# Patient Record
Sex: MALE | Race: WHITE
[De-identification: names, ages, dates, MRNs, and addresses within clinical notes are randomized per-mention and may not be internally consistent; named-entity substitution may affect disease eponyms.]

---

## 2021-07-26 ENCOUNTER — HOSPITAL ENCOUNTER (OUTPATIENT)
Dept: HOSPITAL 47 - ORWHC2ENDO | Age: 36
Discharge: HOME | End: 2021-07-26
Attending: INTERNAL MEDICINE
Payer: OTHER GOVERNMENT

## 2021-07-26 VITALS — TEMPERATURE: 97.6 F

## 2021-07-26 VITALS — DIASTOLIC BLOOD PRESSURE: 76 MMHG | HEART RATE: 61 BPM | SYSTOLIC BLOOD PRESSURE: 117 MMHG

## 2021-07-26 VITALS — RESPIRATION RATE: 16 BRPM

## 2021-07-26 DIAGNOSIS — K21.9: Primary | ICD-10-CM

## 2021-07-26 DIAGNOSIS — D72.820: ICD-10-CM

## 2021-07-26 DIAGNOSIS — K29.50: ICD-10-CM

## 2021-07-26 DIAGNOSIS — Z80.0: ICD-10-CM

## 2021-07-26 DIAGNOSIS — Z12.11: ICD-10-CM

## 2021-07-26 PROCEDURE — 88342 IMHCHEM/IMCYTCHM 1ST ANTB: CPT

## 2021-07-26 PROCEDURE — 88305 TISSUE EXAM BY PATHOLOGIST: CPT

## 2021-07-26 PROCEDURE — 43239 EGD BIOPSY SINGLE/MULTIPLE: CPT

## 2021-07-26 PROCEDURE — 45380 COLONOSCOPY AND BIOPSY: CPT

## 2021-07-26 NOTE — P.PCN
Date of Procedure: 07/26/21


Description of Procedure: 





Brief history:


Patient is a pleasant 36-year-old male presenting for outpatient 

esophagogastroduodenoscopy and colonoscopy for evaluation of GERD and screening 

for colon cancer.  Patient reports a long-standing history of GERD.  Currently 

he is on omeprazole therapy.  Symptoms well controlled on omeprazole however he 

will have breakthrough symptoms of pain this is a medication.  He reports colon 

cancer in his grandparent.  He also states that previously he was told he had 

colitis and needed a colonoscopy.





Procedure performed:


Esophagogastroduodenoscopy with biopsy


Colonoscopy with biopsy





Estimated blood loss:


Minimal.





Preoperative diagnosis:


GERD, screening for malignant neoplasm of the colon, he reports family history 

of colon cancer





Anesthesia: 


MAC





Procedure:


After informed consent was obtained from the patient  was brought into the 

endoscopy unit and IV  sedation was administered by anesthesia under continuous 

monitoring.  Initially upper endoscopy was done.  The Olympus  video 

endoscope was inserted into the mouth and esophagus intubated without any 

difficulty and was gradually advanced into the stomach and duodenum and 

carefully examined.  The bulb and second part of the duodenum appeared normal, 

with biopsies taken to rule out celiac sprue.  The scope was then withdrawn into

the stomach adequately insufflated with air and upon careful examination  the 

antrum and body, cardia and fundus appeared normal, except for some mild 

punctate erythema suggestive of mild gastritis with biopsies.  The scope was 

then withdrawn into the esophagus.  The GE junction was located at 38 cm to the 

incisors.  It appeared regular with no erythema erosions or ulcerations, with 

lower esophageal biopsies taken.  Rest of the esophagus appeared normal.  

Patient tolerated the procedure well.





At this time the patient continued to remain sedation.  Initial digital rectal 

examination was normal.  Olympus  video colonoscope was then inserted into

the rectum and gradually advanced to the cecum without any difficulty.  Careful 

examination was performed as the scope was gradually being withdrawn.  The prep 

was excellent.  The cecum, ascending colon, transverse colon, descending colon, 

sigmoid colon and rectum appeared normal, with random biopsies taken of the 

right and left colon due to patient's reported history of colitis.  The terminal

ileum was intubated and appeared normal with biopsies taken.  Retroflexion was 

performed in the rectum and no lesions were noted.  Patient tolerated the 

procedure well.





Impression:


1.  Mild gastritis.  Biopsies of the duodenum, antrum body and lower esophagus.


2.  Normal-appearing colon from rectum to cecum and normal-appearing terminal 

ileum with random biopsies taken of the right colon, left colon and terminal 

ileum.








Recommendations:


Findings of this examination were discussed with the patient as well as his 

family.  Okay to resume diet.  Okay to resume medications.  Await pathology from

biopsies.  Can repeat colonoscopy in 5 years for family history of colon cancer.

## 2025-03-04 ENCOUNTER — HOSPITAL ENCOUNTER (OUTPATIENT)
Dept: HOSPITAL 47 - EC | Age: 40
Setting detail: OBSERVATION
LOS: 1 days | Discharge: HOME | End: 2025-03-05
Attending: SURGERY | Admitting: SURGERY
Payer: OTHER GOVERNMENT

## 2025-03-04 DIAGNOSIS — Z87.891: ICD-10-CM

## 2025-03-04 DIAGNOSIS — K35.80: Primary | ICD-10-CM

## 2025-03-04 DIAGNOSIS — G47.30: ICD-10-CM

## 2025-03-04 DIAGNOSIS — K21.9: ICD-10-CM

## 2025-03-04 LAB
ALBUMIN SERPL-MCNC: 4.7 G/DL (ref 3.5–5)
ALP SERPL-CCNC: 78 U/L (ref 38–126)
ALT SERPL-CCNC: 76 U/L (ref 4–49)
AMYLASE SERPL-CCNC: 54 U/L (ref 30–110)
ANION GAP SERPL CALC-SCNC: 12 MMOL/L
AST SERPL-CCNC: 51 U/L (ref 17–59)
BASOPHILS # BLD AUTO: 0 K/UL (ref 0–0.2)
BASOPHILS NFR BLD AUTO: 1 %
BUN SERPL-SCNC: 16 MG/DL (ref 9–20)
CALCIUM SPEC-MCNC: 9 MG/DL (ref 8.4–10.2)
CHLORIDE SERPL-SCNC: 98 MMOL/L (ref 98–107)
CO2 SERPL-SCNC: 28 MMOL/L (ref 22–30)
EOSINOPHIL # BLD AUTO: 0 K/UL (ref 0–0.7)
EOSINOPHIL NFR BLD AUTO: 0 %
ERYTHROCYTE [DISTWIDTH] IN BLOOD BY AUTOMATED COUNT: 5.23 M/UL (ref 4.3–5.9)
ERYTHROCYTE [DISTWIDTH] IN BLOOD: 12.3 % (ref 11.5–15.5)
GLUCOSE SERPL-MCNC: 81 MG/DL (ref 74–99)
HCT VFR BLD AUTO: 46.1 % (ref 39–53)
HGB BLD-MCNC: 16.1 GM/DL (ref 13–17.5)
LIPASE SERPL-CCNC: 220 U/L (ref 23–300)
LYMPHOCYTES # SPEC AUTO: 2 K/UL (ref 1–4.8)
LYMPHOCYTES NFR SPEC AUTO: 45 %
MCH RBC QN AUTO: 30.9 PG (ref 25–35)
MCHC RBC AUTO-ENTMCNC: 35 G/DL (ref 31–37)
MCV RBC AUTO: 88.3 FL (ref 80–100)
MONOCYTES # BLD AUTO: 0.4 K/UL (ref 0–1)
MONOCYTES NFR BLD AUTO: 10 %
NEUTROPHILS # BLD AUTO: 2 K/UL (ref 1.3–7.7)
NEUTROPHILS NFR BLD AUTO: 43 %
PH UR: 6 [PH] (ref 5–8)
PLATELET # BLD AUTO: 176 K/UL (ref 150–450)
POTASSIUM SERPL-SCNC: 4.1 MMOL/L (ref 3.5–5.1)
PROT SERPL-MCNC: 7.7 G/DL (ref 6.3–8.2)
SODIUM SERPL-SCNC: 138 MMOL/L (ref 137–145)
SP GR UR: >1.05 (ref 1–1.03)
UROBILINOGEN UR QL STRIP: <2 MG/DL (ref ?–2)
WBC # BLD AUTO: 4.6 K/UL (ref 3.8–10.6)

## 2025-03-04 PROCEDURE — 81003 URINALYSIS AUTO W/O SCOPE: CPT

## 2025-03-04 PROCEDURE — 94760 N-INVAS EAR/PLS OXIMETRY 1: CPT

## 2025-03-04 PROCEDURE — 36415 COLL VENOUS BLD VENIPUNCTURE: CPT

## 2025-03-04 PROCEDURE — 83690 ASSAY OF LIPASE: CPT

## 2025-03-04 PROCEDURE — 87040 BLOOD CULTURE FOR BACTERIA: CPT

## 2025-03-04 PROCEDURE — 82150 ASSAY OF AMYLASE: CPT

## 2025-03-04 PROCEDURE — 85025 COMPLETE CBC W/AUTO DIFF WBC: CPT

## 2025-03-04 PROCEDURE — 74177 CT ABD & PELVIS W/CONTRAST: CPT

## 2025-03-04 PROCEDURE — 88304 TISSUE EXAM BY PATHOLOGIST: CPT

## 2025-03-04 PROCEDURE — 80053 COMPREHEN METABOLIC PANEL: CPT

## 2025-03-04 PROCEDURE — 96374 THER/PROPH/DIAG INJ IV PUSH: CPT

## 2025-03-04 PROCEDURE — 99285 EMERGENCY DEPT VISIT HI MDM: CPT

## 2025-03-04 PROCEDURE — 83605 ASSAY OF LACTIC ACID: CPT

## 2025-03-04 PROCEDURE — 44970 LAPAROSCOPY APPENDECTOMY: CPT

## 2025-03-04 RX ADMIN — CEFAZOLIN ONE MLS: 330 INJECTION, POWDER, FOR SOLUTION INTRAMUSCULAR; INTRAVENOUS at 23:03

## 2025-03-04 RX ADMIN — MORPHINE SULFATE STA MG: 4 INJECTION, SOLUTION INTRAMUSCULAR; INTRAVENOUS at 21:51

## 2025-03-04 RX ADMIN — CEFAZOLIN STA MLS/HR: 330 INJECTION, POWDER, FOR SOLUTION INTRAMUSCULAR; INTRAVENOUS at 21:52

## 2025-03-04 RX ADMIN — LIDOCAINE HYDROCHLORIDE AND EPINEPHRINE ONE ML: 10; 10 INJECTION, SOLUTION INFILTRATION; PERINEURAL at 22:49

## 2025-03-04 RX ADMIN — POTASSIUM CHLORIDE ONE MLS: 14.9 INJECTION, SOLUTION INTRAVENOUS at 23:35

## 2025-03-04 RX ADMIN — PIPERACILLIN AND TAZOBACTAM SCH MLS/HR: 3; .375 INJECTION, POWDER, FOR SOLUTION INTRAVENOUS at 22:46

## 2025-03-04 RX ADMIN — LIDOCAINE HYDROCHLORIDE AND EPINEPHRINE ONE ML: 10; 10 INJECTION, SOLUTION INFILTRATION; PERINEURAL at 23:24

## 2025-03-04 NOTE — P.OP
Date of Procedure: 03/04/25


Preoperative Diagnosis: 


Acute Appendicitis


Postoperative Diagnosis: 


Acute Appendicitis


Procedure(s) Performed: 


Laparoscopic Appendectomy


Anesthesia: ALANNA


Surgeon: Nile Chaparro


Estimated Blood Loss (ml): 10


Pathology: other (Appendix)


Condition: stable


Disposition: PACU


Description of Procedure: 


The patient was seen by me in the preoperative holding area. The risks of the 

procedure were explained. He was taken to the operating room and given 

perioperative antibiotics prior to coming to the surgery. General anesthesia was

carried out without difficulty and a Correia catheter was inserted. The left arm 

was tucked and the abdomen was prepped and draped in sterile fashion. A 5-mm 

blunt port was inserted infra-umbilically at the level of the umbilicus under 

direct vision of a 5-mm 0-degree laparoscope. Once we were inside the abdominal 

cavity, CO2 was instilled to attain an adequate pneumoperitoneum. A left lower 

quadrant 5-mm port was placed under direct vision and a 12-mm port at Mccollum's 

point. The 5-mm scope was introduced at the umbilical port and the appendix was 

easily visualized. The base of the cecum was acutely inflamed but not 

perforated. I then was easily able to grasp the mesoappendix and create a window

between the base of the mesoappendix and the base of the appendix. The 

mesoappendix was taken down with a ligasure device.  The window was big enough 

to get an Endo YULIA purple 60 mm cartridge through it and fired across the base 

of the appendix without difficulty.  I then placed the appendix in an Endobag 

and brought out through the 12 mm port without difficulty. I reinserted the 12 

mm port and irrigated out the right lower quadrant until dry. One final 

inspection revealed no bleeding from the staple line. We then removed all ports 

under direct vision, and there was no bleeding from the abdominal trocar sites. 

The pneumoperitoneum was then deflated. The skin incisions were closed with 4-0 

Monocryl suture. Skin glue was applied. There were no apparent complications.  

There was minimal blood loss. Patient was brought to the recovery room in stable

condition.

## 2025-03-04 NOTE — CT
EXAMINATION TYPE: CT abdomen pelvis w con

 

DATE OF EXAM: 3/4/2025 9:02 PM

 

COMPARISON: CT abdomen pelvis most recent from  2/18/2025

 

CLINICAL INDICATION: Male, 39 years old with history of rlq pain. eval for appendicitis; RLQ abdomina
l pain x 1 week. hx of appenciditis 1 week ago, tried antibiotics to solve it

 

TECHNIQUE:  Axial CT abdomen pelvis w con;Sagittal and coronal reformats were created on a separate w
orkstation. 

Contrast used:100ml mL of Isovue 300 with IV Contrast, (none if empty)

Oral contrast used: without Oral Contrast (none if empty)

CT DLP: 1317.1 mGycm, Automated exposure control for dose reduction was used.

 

FINDINGS: 

LOWER CHEST: Unremarkable

 

ABDOMEN

LIVER: Diffusely hypoattenuating parenchyma.

GALLBLADDER AND BILE DUCTS: Unremarkable.

PANCREAS: Unremarkable.

SPLEEN: Unremarkable.

ADRENAL GLANDS: Unremarkable.

KIDNEYS AND URETERS: Multiple bilateral nonobstructing calculi measuring up to 3 mm.

 

PELVIS

BLADDER: No evidence for wall thickening or mass given limitations of exam.

REPRODUCTIVE: Unremarkable.

 

ABDOMEN & PELVIS

STOMACH AND BOWEL: Small hiatal hernia, duodenum is unremarkable No evidence of bowel obstruction. De
crease in inflammation around the apex on today's exam compared to 2/18/2025

PERITONEUM/RETROPERITONEUM: No evidence of pneumoperitoneum or free fluid.

VASCULATURE: No evidence of aortic aneurysm. 

MUSCULOSKELETAL: No acute osseous abnormalities

LYMPH NODES: No gross evidence for lymphadenopathy.

SOFT TISSUE/ABDOMINAL WALL: Unremarkable

 

IMPRESSION:

1.  Decrease in inflammation around the appendix.

2.  No evidence for obstructive uropathy. Bilateral nonobstructing renal calculi.

3.  .

4.  Hepatic steatosis.

 

[facility], Workstation: [id number], 3/4/2025 9:31 PM

## 2025-03-04 NOTE — P.GSHP
History of Present Illness


H&P Date: 03/04/25


This is a 39 year old male who presents to [facility] ER with RLQ pain.  He has 

associated nausea.  He was recently diagnosed with appendicitis.  At that time, 

he was treated conservatively with antibiotics and appeared to get better.  

However, today he presents with recurrent RLQ pain and the same symptoms as his 

previous appendicitis. He had a CT-AP which shows some inflammatory changes near

his appendix.








ROS Statement: 


Those systems with pertinent positive or pertinent negative responses have been 

documented in the HPI.





ROS Other: All systems not noted in ROS Statement are negative.





Past Medical History


Past Medical History: GERD/Reflux, Sleep Apnea/CPAP/BIPAP


Additional Past Medical History / Comment(s): UPPER GI BLEED POSSIBLE. USES 

CPAP, appendicitis


History of Any Multi-Drug Resistant Organisms: None Reported


Past Surgical History: No Surgical Hx Reported


Additional Past Surgical History / Comment(s): ONLY DENTAL PROCEDURES. Right 

Bicep reattached.


Past Anesthesia/Blood Transfusion Reactions: No Reported Reaction


Additional Past Anesthesia/Blood Transfusion Reaction / Comment(s): HAS NEVER 

HAD ANESTHESIA.


Past Psychological History: Anxiety, Depression, PTSD


Smoking Status: Former smoker


Past Alcohol Use History: Rare


Past Drug Use History: Marijuana





General Exam


General-NAD


[facility]-RRR


Lungs-NLB


Abdomen-soft, TTP RLQ, guarding


Ext-no edema





39 year old male with appendicitis


-OR for Laparoscopic Appendectomy


-NPO


-Zosyn


-IV fluids





Nile Chaparro [facility]


[phone]








Past Medical History


Past Medical History: GERD/Reflux, Sleep Apnea/CPAP/BIPAP


Additional Past Medical History / Comment(s): UPPER GI BLEED POSSIBLE. USES 

CPAP, appendicitis


History of Any Multi-Drug Resistant Organisms: None Reported


Past Surgical History: No Surgical Hx Reported


Additional Past Surgical History / Comment(s): ONLY DENTAL PROCEDURES. Right Bic

ep reattached.


Past Anesthesia/Blood Transfusion Reactions: No Reported Reaction


Additional Past Anesthesia/Blood Transfusion Reaction / Comment(s): HAS NEVER 

HAD ANESTHESIA.


Past Psychological History: Anxiety, Depression, PTSD


Smoking Status: Former smoker


Past Alcohol Use History: Rare


Past Drug Use History: Marijuana





Medications and Allergies


                                Home Medications











 Medication  Instructions  Recorded  Confirmed  Type


 


Ibuprofen [Advil] 200 - 400 mg PO Q6H PRN 07/22/21 02/18/25 History


 


Omeprazole 20 mg PO DAILY 07/22/21 02/18/25 History


 


Amoxic-Pot Clav 875-125Mg 1 tab PO Q12HR 7 Days #14 tab 02/19/25  Rx





[Augmentin 875-125]    








                                    Allergies











Allergy/AdvReac Type Severity Reaction Status Date / Time


 


No Known Allergies Allergy   Verified 03/04/25 19:59














Surgical - Exam


                                   Vital Signs











Temp Pulse Resp BP Pulse Ox


 


 98.8 F   82   18   138/80   99 


 


 03/04/25 19:59  03/04/25 19:59  03/04/25 19:59  03/04/25 19:59  03/04/25 19:59














Results





- Labs





                                 03/04/25 20:14





                                 03/04/25 20:14


                  Abnormal Lab Results - Last 24 Hours (Table)











  03/04/25 Range/Units





  20:14 


 


ALT  76 H  (4-49)  U/L








                                 Diabetes panel











  03/04/25 Range/Units





  20:14 


 


Sodium  138  (137-145)  mmol/L


 


Potassium  4.1  (3.5-5.1)  mmol/L


 


Chloride  98  ()  mmol/L


 


Carbon Dioxide  28  (22-30)  mmol/L


 


BUN  16  (9-20)  mg/dL


 


Creatinine  0.98  (0.66-1.25)  mg/dL


 


Glucose  81  (74-99)  mg/dL


 


Calcium  9.0  (8.4-10.2)  mg/dL


 


AST  51  (17-59)  U/L


 


ALT  76 H  (4-49)  U/L


 


Alkaline Phosphatase  78  ()  U/L


 


Total Protein  7.7  (6.3-8.2)  g/dL


 


Albumin  4.7  (3.5-5.0)  g/dL








                                  Calcium panel











  03/04/25 Range/Units





  20:14 


 


Calcium  9.0  (8.4-10.2)  mg/dL


 


Albumin  4.7  (3.5-5.0)  g/dL








                                 Pituitary panel











  03/04/25 Range/Units





  20:14 


 


Sodium  138  (137-145)  mmol/L


 


Potassium  4.1  (3.5-5.1)  mmol/L


 


Chloride  98  ()  mmol/L


 


Carbon Dioxide  28  (22-30)  mmol/L


 


BUN  16  (9-20)  mg/dL


 


Creatinine  0.98  (0.66-1.25)  mg/dL


 


Glucose  81  (74-99)  mg/dL


 


Calcium  9.0  (8.4-10.2)  mg/dL








                                  Adrenal panel











  03/04/25 Range/Units





  20:14 


 


Sodium  138  (137-145)  mmol/L


 


Potassium  4.1  (3.5-5.1)  mmol/L


 


Chloride  98  ()  mmol/L


 


Carbon Dioxide  28  (22-30)  mmol/L


 


BUN  16  (9-20)  mg/dL


 


Creatinine  0.98  (0.66-1.25)  mg/dL


 


Glucose  81  (74-99)  mg/dL


 


Calcium  9.0  (8.4-10.2)  mg/dL


 


Total Bilirubin  0.8  (0.2-1.3)  mg/dL


 


AST  51  (17-59)  U/L


 


ALT  76 H  (4-49)  U/L


 


Alkaline Phosphatase  78  ()  U/L


 


Total Protein  7.7  (6.3-8.2)  g/dL


 


Albumin  4.7  (3.5-5.0)  g/dL

## 2025-03-04 NOTE — ED
General Adult HPI





- General


Chief complaint: Abdominal Pain


Stated complaint: Abd Pain


Time Seen by Provider: 03/04/25 20:00


Source: patient, RN notes reviewed, old records reviewed


Mode of arrival: ambulatory


Limitations: no limitations





- History of Present Illness


Initial comments: 





Patient is a 39-year-old male who presents emergency department complaining of 

abdominal pain.  Was recently seen in February 2025 a few weeks ago for acute 

appendicitis.  No surgery at that time, as it was uncomplicated.  Treated with 

IV antibiotics and then oral antibiotics at home.  Was due to follow-up with his

surgeon, Dr. Young in 1 week however patient states that over the last day or 

so has been having worsening right lower quadrant abdominal pain.  Worse with 

flexion of the right leg as well as some tenderness on the left side with 

palpation.  Endorses less of an appetite.  Denies emesis.  Denies diarrhea or 

constipation.  Presents for further evaluation at this time.  Believes his 

appendicitis is acting up.





- Related Data


                                Home Medications











 Medication  Instructions  Recorded  Confirmed


 


Ibuprofen [Advil] 200 - 400 mg PO Q6H PRN 07/22/21 02/18/25


 


Omeprazole 20 mg PO DAILY 07/22/21 02/18/25








                                  Previous Rx's











 Medication  Instructions  Recorded


 


Amoxic-Pot Clav 875-125Mg 1 tab PO Q12HR 7 Days #14 tab 02/19/25





[Augmentin 875-125]  











                                    Allergies











Allergy/AdvReac Type Severity Reaction Status Date / Time


 


No Known Allergies Allergy   Verified 03/04/25 19:59














Review of Systems


ROS Statement: 


Those systems with pertinent positive or pertinent negative responses have been 

documented in the HPI.


Review of Systems:


CONST: Denies fever


EYES: Denies blurry vision


ENT: Denies nasal congestion


C/V: Denies Chest pain


RESP: Denies shortness of breath


GI: Endorses right lower quadrant abdominal pain


: Denies dysuria


SKIN: Denies rash.


MSK: Denies joint pain.


NEURO: Denies headache





ROS Other: All systems not noted in ROS Statement are negative.





Past Medical History


Past Medical History: GERD/Reflux, Sleep Apnea/CPAP/BIPAP


Additional Past Medical History / Comment(s): UPPER GI BLEED POSSIBLE. USES 

CPAP, appendicitis


History of Any Multi-Drug Resistant Organisms: None Reported


Past Surgical History: No Surgical Hx Reported


Additional Past Surgical History / Comment(s): ONLY DENTAL PROCEDURES. Right B

icep reattached.


Past Anesthesia/Blood Transfusion Reactions: No Reported Reaction


Additional Past Anesthesia/Blood Transfusion Reaction / Comment(s): HAS NEVER 

HAD ANESTHESIA.


Past Psychological History: Anxiety, Depression, PTSD


Smoking Status: Former smoker


Past Alcohol Use History: Rare


Past Drug Use History: Marijuana





General Exam





- General Exam Comments


Initial Comments: 





General: He is in mild to moderate distress.


HEAD: Normal with no signs of head trauma.


EYES: EOMI


ENT: Hearing grossly intact, normal oropharynx.


RESPIRATORY: Clear breath sounds bilaterally.  No wheezes, rales, or rhonchi.


C/V: Regular rate and rhythm. S1 and S2 auscultated, no edema, peripheral pulses

2+ and intact throughout


ABD: Abdomen soft, nondistended.  Tender to palpation in the right lower 

quadrant.  Appears to have positive Rovsing sign as well as obturator sign.  No 

guarding.  No rebound tenderness.


EXT: Normal range of motion, no obvious deformity


SKIN: No rashes or lesions observed on exposed skin.


NEURO: Alert and oriented x 4.





Limitations: no limitations





Course


                                   Vital Signs











  03/04/25





  19:59


 


Temperature 98.8 F


 


Pulse Rate 82


 


Respiratory 18





Rate 


 


Blood Pressure 138/80


 


O2 Sat by Pulse 99





Oximetry 














Medical Decision Making





- Medical Decision Making





Was pt. sent in by a medical professional or institution (, PA, NP, urgent 

care, hospital, or nursing home...) When possible be specific


@  -No


Did you speak to anyone other than the patient for history (EMS, parent, family,

police, friend...)? What history was obtained from this source 


@  -No


Did you review nursing and triage notes (agree or disagree)?  Why? 


@  -I reviewed and agree with nursing and triage notes


Were old charts reviewed (outside hosp., previous admission, EMS record, old 

EKG, old radiological studies, urgent care reports/EKG's, nursing home records)?

Report findings 


@  -Reviewed chart from February 2025 which did show patient had uncomplicated 

appendicitis that was treated medically with antibiotics.


Differential Diagnosis (chest pain, altered mental status, abdominal pain women,

abdominal pain men, vaginal bleeding, weakness, fever, dyspnea, syncope, 

headache, dizziness, GI bleed, back pain, seizure, CVA, palpatations, mental 

health, musculoskeletal)? 


@  -Differential Abdominal Pain Men:


Appendicitis, cholecystitis, diverticulosis, ischemic bowel, pancreatitis, 

hepatitis, UTI, gastroenteritis, AAA, incarcerated hernia, bowel obstruction, 

constipation, inflammatory bowel, hepatitis, peptic ulcer disease, splenic 

infarction, perforated viscus, testicular torsion, this is not meant to be an 

all-inclusive list





EKG interpreted by me (3pts min.).


@  -None done


X-rays interpreted by me (1pt min.).


@  -None done


CT interpreted by me (1pt min.).


@  -CT abdomen pelvis today reveals no evidence of acute appendicitis.


U/S interpreted by me (1pt. min.).


@  -None done


What testing was considered but not performed or refused? (CT, X-rays, U/S, 

labs)? Why?


@  -None


What meds were considered but not given or refused? Why?


@  -None


Did you discuss the management of the patient with other professionals 

(professionals i.e. , PA, NP, lab, RT, psych nurse, , , 

teacher, , )? Give summary


@  -Discussed with Dr. Chaparro, on-call general surgeon who is covering for Dr. Young.  Discussed that patient is clinically presenting as acute appendicitis 

and he requested patient be made n.p.o. and plans for appendectomy this evening.

 Patient will be admitted under his service.


Was smoking cessation discussed for >3mins.?


@  -No


Was critical care preformed (if so, how long)?


@  -No


Were there social determinants of health that impacted care today? How? 

(Homelessness, low income, unemployed, alcoholism, drug addiction, 

transportation, low edu. Level, literacy, decrease access to med. care, FDC, 

rehab)?


@  -No


Was there de-escalation of care discussed even if they declined (Discuss DNR or 

withdrawal of care, Hospice)? DNR status


@  -No


What co-morbidities impacted this encounter? (DM, HTN, Smoking, COPD, CAD, 

Cancer, CVA, ARF, Chemo, Hep., AIDS, mental health diagnosis, sleep apnea, 

morbid obesity)?


@  -None


Was patient admitted / discharged? Hospital course, mention meds given and 

route, prescriptions, significant lab abnormalities, going to OR and other 

pertinent info.


@  -Based on the patient's presentation and physical exam, patient presents to 

the emergency department complaining of reoccurrence of appendicitis pain.  Was 

receiving medical management but over the last day, symptoms have returned.  

Seems to have McBurney's point positive, Rovsing sign positive, as well as 

obturator sign positive.  Patient was evaluated in the waiting room, and 

abdominal labs as well as CT ordered.  Vitals are within acceptable limits.





Laboratory studies unremarkable.  CT does not show acute appendicitis.





On reevaluation, patient clinically is presenting as acute appendicitis.  With 

him recently being diagnosed with it less than a month ago and medically manage,

I did contact the on-call surgeon who is covering for Dr. Young, Dr. Chaparro who 

is in agreement and will take the patient to the operating room for 

appendectomy.  Patient admitted to Dr. Chaparro.  Patient started on Zosyn.  He was

in agreement this plan.


Undiagnosed new problem with uncertain prognosis?


@  -No


Drug Therapy requiring intensive monitoring for toxicity (Heparin, Nitro, 

Insulin, Cardizem)?


@  -No


Were any procedures done?


@  -No


Diagnosis/symptom?


@  -Appendicitis


Acute, or Chronic, or Acute on Chronic?


@  -Acute


Uncomplicated (without systemic symptoms) or Complicated (systemic symptoms)?


@  -Complicated


Side effects of treatment?


@  -No


Exacerbation, Progression, or Severe Exacerbation?


@  -No


Poses a threat to life or bodily function? How? (Chest pain, USA, MI, pneumonia,

PE, COPD, DKA, ARF, appy, cholecystitis, CVA, Diverticulitis, Homicidal, 

Suicidal, threat to staff... and all critical care pts)


@  -Possibly, yes








- Lab Data


Result diagrams: 


                                 03/04/25 20:14





                                 03/04/25 20:14


                                   Lab Results











  03/04/25 03/04/25 03/04/25 Range/Units





  20:14 20:14 21:26 


 


WBC  4.6    (3.8-10.6)  k/uL


 


RBC  5.23    (4.30-5.90)  m/uL


 


Hgb  16.1    (13.0-17.5)  gm/dL


 


Hct  46.1    (39.0-53.0)  %


 


MCV  88.3    (80.0-100.0)  fL


 


MCH  30.9    (25.0-35.0)  pg


 


MCHC  35.0    (31.0-37.0)  g/dL


 


RDW  12.3    (11.5-15.5)  %


 


Plt Count  176    (150-450)  k/uL


 


MPV  9.5    


 


Neutrophils %  43    %


 


Lymphocytes %  45    %


 


Monocytes %  10    %


 


Eosinophils %  0    %


 


Basophils %  1    %


 


Neutrophils #  2.0    (1.3-7.7)  k/uL


 


Lymphocytes #  2.0    (1.0-4.8)  k/uL


 


Monocytes #  0.4    (0-1.0)  k/uL


 


Eosinophils #  0.0    (0-0.7)  k/uL


 


Basophils #  0.0    (0-0.2)  k/uL


 


Sodium   138   (137-145)  mmol/L


 


Potassium   4.1   (3.5-5.1)  mmol/L


 


Chloride   98   ()  mmol/L


 


Carbon Dioxide   28   (22-30)  mmol/L


 


Anion Gap   12   mmol/L


 


BUN   16   (9-20)  mg/dL


 


Creatinine   0.98   (0.66-1.25)  mg/dL


 


Est GFR (CKD-EPI)AfAm   >90   (>60 ml/min/1.73 sqM)  


 


Est GFR (CKD-EPI)NonAf   >90   (>60 ml/min/1.73 sqM)  


 


Glucose   81   (74-99)  mg/dL


 


Plasma Lactic Acid King    0.8  (0.7-2.0)  mmol/L


 


Calcium   9.0   (8.4-10.2)  mg/dL


 


Total Bilirubin   0.8   (0.2-1.3)  mg/dL


 


AST   51   (17-59)  U/L


 


ALT   76 H   (4-49)  U/L


 


Alkaline Phosphatase   78   ()  U/L


 


Total Protein   7.7   (6.3-8.2)  g/dL


 


Albumin   4.7   (3.5-5.0)  g/dL


 


Amylase   54   ()  U/L


 


Lipase   220   ()  U/L














Disposition


Clinical Impression: 


 Appendicitis





Disposition: ADMITTED AS IP TO THIS HOSP


Condition: Stable


Referrals: 


Nikko Palma DO [Primary Care Provider] - 1-2 days


Time of Disposition: 21:57

## 2025-03-05 VITALS
DIASTOLIC BLOOD PRESSURE: 66 MMHG | SYSTOLIC BLOOD PRESSURE: 103 MMHG | RESPIRATION RATE: 17 BRPM | HEART RATE: 92 BPM | TEMPERATURE: 99.2 F

## 2025-03-05 LAB
ALBUMIN SERPL-MCNC: 3.7 G/DL (ref 3.5–5)
ALP SERPL-CCNC: 62 U/L (ref 38–126)
ALT SERPL-CCNC: 62 U/L (ref 4–49)
ANION GAP SERPL CALC-SCNC: 9 MMOL/L
AST SERPL-CCNC: 41 U/L (ref 17–59)
BASOPHILS # BLD AUTO: 0 K/UL (ref 0–0.2)
BASOPHILS NFR BLD AUTO: 0 %
BUN SERPL-SCNC: 14 MG/DL (ref 9–20)
CALCIUM SPEC-MCNC: 8.2 MG/DL (ref 8.4–10.2)
CHLORIDE SERPL-SCNC: 102 MMOL/L (ref 98–107)
CO2 SERPL-SCNC: 24 MMOL/L (ref 22–30)
EOSINOPHIL # BLD AUTO: 0 K/UL (ref 0–0.7)
EOSINOPHIL NFR BLD AUTO: 0 %
ERYTHROCYTE [DISTWIDTH] IN BLOOD BY AUTOMATED COUNT: 4.48 M/UL (ref 4.3–5.9)
ERYTHROCYTE [DISTWIDTH] IN BLOOD: 12.1 % (ref 11.5–15.5)
GLUCOSE SERPL-MCNC: 98 MG/DL (ref 74–99)
HCT VFR BLD AUTO: 40.6 % (ref 39–53)
HGB BLD-MCNC: 13.3 GM/DL (ref 13–17.5)
LYMPHOCYTES # SPEC AUTO: 1.3 K/UL (ref 1–4.8)
LYMPHOCYTES NFR SPEC AUTO: 32 %
MCH RBC QN AUTO: 29.8 PG (ref 25–35)
MCHC RBC AUTO-ENTMCNC: 32.9 G/DL (ref 31–37)
MCV RBC AUTO: 90.7 FL (ref 80–100)
MONOCYTES # BLD AUTO: 0.3 K/UL (ref 0–1)
MONOCYTES NFR BLD AUTO: 7 %
NEUTROPHILS # BLD AUTO: 2.4 K/UL (ref 1.3–7.7)
NEUTROPHILS NFR BLD AUTO: 59 %
PLATELET # BLD AUTO: 158 K/UL (ref 150–450)
POTASSIUM SERPL-SCNC: 3.8 MMOL/L (ref 3.5–5.1)
PROT SERPL-MCNC: 6.2 G/DL (ref 6.3–8.2)
SODIUM SERPL-SCNC: 135 MMOL/L (ref 137–145)
WBC # BLD AUTO: 4 K/UL (ref 3.8–10.6)

## 2025-03-05 RX ADMIN — CEFAZOLIN STA: 330 INJECTION, POWDER, FOR SOLUTION INTRAMUSCULAR; INTRAVENOUS at 06:21

## 2025-03-05 RX ADMIN — PIPERACILLIN AND TAZOBACTAM SCH MLS/HR: 3; .375 INJECTION, POWDER, FOR SOLUTION INTRAVENOUS at 08:59

## 2025-03-05 RX ADMIN — HYDROMORPHONE HYDROCHLORIDE PRN MG: 1 INJECTION, SOLUTION INTRAMUSCULAR; INTRAVENOUS; SUBCUTANEOUS at 08:56

## 2025-03-05 RX ADMIN — HYDROCODONE BITARTRATE AND ACETAMINOPHEN PRN EACH: 10; 325 TABLET ORAL at 14:05

## 2025-03-05 RX ADMIN — HYDROMORPHONE HYDROCHLORIDE STA MG: 1 INJECTION, SOLUTION INTRAMUSCULAR; INTRAVENOUS; SUBCUTANEOUS at 00:08

## 2025-03-05 RX ADMIN — CEFAZOLIN SCH MLS/HR: 330 INJECTION, POWDER, FOR SOLUTION INTRAMUSCULAR; INTRAVENOUS at 00:57

## 2025-03-05 NOTE — P.DS
Providers


Date of admission: 


03/04/25 21:57





Expected date of discharge: 03/05/25


Attending physician: 


Nile Chaparro DO





Consults: 





                                        





03/04/25 22:27


Consult Physician Routine 


   Consulting Provider: Aubrie Stewart


   Consult Reason/Comments: Medical Management


   Do you want consulting provider notified?: Yes











Primary care physician: 


[facility] Course: 





Discharge diagnosis


1.  Acute appendicitis status post laparoscopic appendectomy





Hospital course


This is a 39-year-old male who presented with right lower quadrant abdominal 

pain.He was recently diagnosed with appendicitis.  At that time, he was treated 

conservatively with antibiotics and appeared to get better.  However, today he 

presents with recurrent RLQ pain and the same symptoms as his previous 

appendicitis. He had a CT-AP which shows some inflammatory changes near his 

appendix.  Patient status post laparoscopic appendectomy for acute appendicitis.

 Patient tolerated surgery well.  His pain is controlled.  He is tolerating 

diet.  He has been up and ambulating.  He is afebrile.  Patient is stable for 

discharge.  Please refer to chart for any further details.





Physician Assistant note has been reviewed by physician. Signing provider agrees

with the documented findings, assessment, and plan of care.





Attestation


Patient seen and examined at bedside on 3/5/2025.  Presented with chief 

complaint of abdominal pain and found to have acute appendicitis.  He did 

undergo laparoscopic appendectomy.  Postoperatively his pain is controlled and 

he is tolerating diet.  He is afebrile and is requesting discharge.  Is 

reasonable for discharge with plan for outpatient antibiotics.  Plan to follow-

up in the surgery clinic within a few weeks.  Wound care and activity 

instructions were provided.





Ingrid Eastman DO


Patient Condition at Discharge: Stable





Plan - Discharge Summary


Discharge Rx Participant: No


New Discharge Prescriptions: 


New


   HYDROcodone/APAP 5-325MG [Norco 5-325] 1 tab PO Q6HR PRN 3 Days #12 tab


     PRN Reason: Pain


   Amoxic-Pot Clav 500-125 mg [Augmentin 500-125 mg] 1 tab PO Q12HR #6 tab





Continue


   Ibuprofen [Advil] 200 - 400 mg PO Q6H PRN


     PRN Reason: MIGRAINES


   Omeprazole 20 mg PO DAILY


Discharge Medication List





Ibuprofen [Advil] 200 - 400 mg PO Q6H PRN 07/22/21 [History]


Omeprazole 20 mg PO DAILY 07/22/21 [History]


Amoxic-Pot Clav 500-125 mg [Augmentin 500-125 mg] 1 tab PO Q12HR #6 tab 03/05/25

[Rx]


HYDROcodone/APAP 5-325MG [Norco 5-325] 1 tab PO Q6HR PRN 3 Days #12 tab 03/05/25

[Rx]








Follow up Appointment(s)/Referral(s): 


Nile Chaparro DO [Medical Doctor] - 03/11/25 8:45 am


Nikko Palma DO [Primary Care Provider] - 1-2 days


Patient Instructions/Handouts:  Laparoscopic Appendectomy (GEN)


Activity/Diet/Wound Care/Special Instructions: 


No driving while taking Norco


No lifting over 10 pounds


You may shower. No soaking or tub baths for 2 weeks


Very light activity until you are reevaluated at your follow up appointment with

your surgeon


Discharge Disposition: HOME SELF-CARE

## 2025-03-05 NOTE — P.CONS
History of Present Illness





- Reason for Consult


Consult date: 03/05/25





- Chief Complaint


abdominal pain





- History of Present Illness





Hospital course





This is a 31-year-old male no past medical history presents emergency department

with right-sided lower quadrant abdominal pain.  Underwent appendectomy with no 

complications.  Medicine was consulted for medical management.  Doing well, pain

is well-managed at this time.  Takes no home medications.











Past Medical History


Past Medical History: GERD/Reflux, Sleep Apnea/CPAP/BIPAP


Additional Past Medical History / Comment(s): UPPER GI BLEED POSSIBLE. USES 

CPAP,


History of Any Multi-Drug Resistant Organisms: None Reported


Past Surgical History: No Surgical Hx Reported, Appendectomy


Additional Past Surgical History / Comment(s): ONLY DENTAL PROCEDURES. Right 

Bicep reattached.


Past Anesthesia/Blood Transfusion Reactions: No Reported Reaction


Additional Past Anesthesia/Blood Transfusion Reaction / Comm: HAS NEVER HAD 

ANESTHESIA.


Past Psychological History: Anxiety, Depression, PTSD


Additional Psychological History / Comment(s): WAS IN [facility] FOR ABOUT A 

YEAR.


Smoking Status: Former smoker


Past Alcohol Use History: Rare


Additional Past Alcohol Use History / Comment(s): QUIT 2014, ,5 PPD,


Past Drug Use History: Marijuana


Additional Drug Use History / Comment(s): USES MARIJUANA FOR PSTD





Medications and Allergies


                                Home Medications











 Medication  Instructions  Recorded  Confirmed  Type


 


Ibuprofen [Advil] 200 - 400 mg PO Q6H PRN 07/22/21 03/05/25 History


 


Omeprazole 20 mg PO DAILY 07/22/21 03/05/25 History


 


Amoxic-Pot Clav 500-125 mg 1 tab PO Q12HR #6 tab 03/05/25  Rx





[Augmentin 500-125 mg]    


 


HYDROcodone/APAP 5-325MG [Norco 1 tab PO Q6HR PRN 3 Days #12 tab 03/05/25  Rx





5-325]    








                                    Allergies











Allergy/AdvReac Type Severity Reaction Status Date / Time


 


No Known Allergies Allergy   Verified 03/04/25 19:59














Physical Exam


Vitals: 


                                   Vital Signs











  Temp Pulse Pulse Resp BP BP BP


 


 03/05/25 08:00    92  17   


 


 03/05/25 07:49       


 


 03/05/25 07:00  99.2 F   92  17   103/66 


 


 03/05/25 02:30    56 L     99/64


 


 03/05/25 02:00    65    


 


 03/05/25 01:30    58 L     96/56


 


 03/05/25 01:07    51 L     100/64


 


 03/05/25 00:40  98.8 F   67  18    125/71


 


 03/05/25 00:30    65  14    112/60


 


 03/05/25 00:22    70  14    113/60


 


 03/05/25 00:07    70  14    113/57


 


 03/04/25 23:52    75  14    116/58


 


 03/04/25 22:55    78  16    116/72


 


 03/04/25 19:59  98.8 F  82   18  138/80  














  Pulse Ox


 


 03/05/25 08:00 


 


 03/05/25 07:49  98


 


 03/05/25 07:00  98


 


 03/05/25 02:30  98


 


 03/05/25 02:00 


 


 03/05/25 01:30  97


 


 03/05/25 01:07  97


 


 03/05/25 00:40  93 L


 


 03/05/25 00:30  95


 


 03/05/25 00:22  98


 


 03/05/25 00:07  91 L


 


 03/04/25 23:52  98


 


 03/04/25 22:55  98


 


 03/04/25 19:59  99








                                Intake and Output











 03/05/25 03/05/25 03/05/25





 06:59 14:59 22:59


 


Intake Total 950 118 


 


Output Total 5  


 


Balance 945 118 


 


Intake:   


 


    


 


  Oral  118 


 


Output:   


 


  Estimated Blood Loss 5  


 


Other:   


 


  Voiding Method Toilet Toilet 


 


  Weight 102.058 kg  














- Constitutional


General appearance: average body habitus, mild distress





- EENT


Eyes: PERRLA





- Cardiovascular


Rhythm: regular


Heart sounds: normal: S1, S2





- Gastrointestinal


General gastrointestinal: normal bowel sounds





- Neurologic


Neurologic: CNII-XII intact





- Musculoskeletal


Musculoskeletal: gait normal





- Psychiatric


Psychiatric: A&O x's 3, appropriate affect





Results


CBC & Chem 7: 


                                 03/05/25 01:32





                                 03/05/25 01:32


Labs: 


                  Abnormal Lab Results - Last 24 Hours (Table)











  03/04/25 03/04/25 03/05/25 Range/Units





  20:14 23:00 01:32 


 


Sodium    135 L  (137-145)  mmol/L


 


Calcium    8.2 L  (8.4-10.2)  mg/dL


 


ALT  76 H   62 H  (4-49)  U/L


 


Total Protein    6.2 L  (6.3-8.2)  g/dL


 


Ur Specific Gravity   >1.050 H   (1.001-1.035)  














Assessment and Plan


Assessment: 





Uncomplicated appendicitis status post appendectomy 


Mild pain control per primary


IV fluid resuscitation when needed


And related to early or possible


Stable for discharge from medicine standpoint